# Patient Record
Sex: FEMALE | Race: WHITE
[De-identification: names, ages, dates, MRNs, and addresses within clinical notes are randomized per-mention and may not be internally consistent; named-entity substitution may affect disease eponyms.]

---

## 2017-07-02 NOTE — EMERGENCY DEPARTMENT RECORD
History of Present Illness





- General


Chief Complaint: Back Pain/Injury


Stated Complaint: BACK,NECK,HEAD PAIN- HX HAD BABY 17


Time Seen by Provider: 17 13:39


Source: Patient, RN notes reviewed





- History of Present Illness


Initial Comments: 


patient complaining of a headache and neck and back pain and delivered a baby 

about one week ago and she left [facility] 2 days ago and she has had the neck and 

lyric pain then but now she has a headache. No vomiting No fever and she is not 

toxic looking. No nuchal signs. Patient had preeclampsia  and  on labetalol 

400mg bid





Onset/Timin


-: Days(s)


Severity scale (1-10): 10


Consistency: Constant


Improves With: None


Context: Other


Associated Symptoms: Headaches


Treatments Prior to Arrival: Prescription analgesics





- Related Data


 Home Medications











 Medication  Instructions  Recorded  Confirmed  Last Taken


 


Acetaminop W/ Codeine 300/30Mg 1 tab PO Q4H 17





[Tylenol #3]    


 


Docusate Sodium [Colace] 100 mg PO BID 17


 


Ferrous Sulfate 325 mg PO BID 17


 


Ibuprofen [Motrin 600Mg] 600 mg PO Q6H 17


 


Labetalol HCl 100 mg PO BID 17


 


Labetalol HCl 200 mg PO BID 17


 


Sertraline HCl [Zoloft] 50 mg PO DAILY 17








 Previous Rx's











 Medication  Instructions  Recorded


 


Hydrocodone/Acetaminophen [Norco 1 each PO Q6HR #14 tablet 17





5-325 Tablet]  











 Allergies











Allergy/AdvReac Type Severity Reaction Status Date / Time


 


No Known Drug Allergies Allergy   Verified 17 13:32














Travel Screening





- Travel/Exposure Within Last 30 Days


Have you traveled within the last 30 days?: No





- Travel/Exposure Within Last Year


Have you traveled outside the [place] in the last year?: No





- Additonal Travel Details


Have you been exposed to anyone with a communicable illness?: No





- Travel Symptoms


Symptom Screening: None





Review of Systems


Reviewed: No additional complaints except as noted below


Constitutional: Reports: As per HPI.  Denies: Chills, Fever, Malaise, Night 

sweats, Weakness, Weight change


Eyes: Reports: As per HPI.  Denies: Eye discharge, Eye pain, Photophobia, 

Vision change


ENT: Reports: As per HPI.  Denies: Congestion, Dental pain, Ear pain, Epistaxis

, Hearing loss, Throat pain


Respiratory: Reports: As per HPI.  Denies: Cough, Dyspnea, Hemoptysis, Stridor, 

Wheezes


Cardiovascular: Reports: As per HPI.  Denies: Arrhythmia, Chest pain, Dyspnea 

on exertion, Edema, Murmurs, Orthopnea, Palpitations, Paroxysmal nocturnal 

dyspnea, Rheumatic Fever, Syncope


Endocrine: Reports: As per HPI.  Denies: Fatigue, Heat or cold intolerance, 

Polydipsia, Polyuria


Gastrointestinal: Reports: As per HPI.  Denies: Abdominal pain, Constipation, 

Diarrhea, Hematemesis, Hematochezia, Melena, Nausea, Vomiting


Genitourinary: Reports: As per HPI.  Denies: Abnormal menses, Discharge, 

Dyspareunia, Dysuria, Frequency, Hematuria, Incontinence, Retention, Urgency


Musculoskeletal: Reports: As per HPI, Back pain, Neck pain.  Denies: Arthralgia

, Gout, Joint swelling, Myalgia


Skin: Reports: As per HPI.  Denies: Bruising, Change in color, Change in hair/

nails, Lesions, Pruritus, Rash


Neurological: Reports: As per HPI, Headache.  Denies: Abnormal gait, Confusion, 

Numbness, Paresthesias, Seizure, Tingling, Tremors, Vertigo, Weakness


Psychiatric: Reports: As per HPI.  Denies: Anxiety, Auditory hallucinations, 

Depression, Homicidal thoughts, Suicidal thoughts, Visual hallucinations


Hematological/Lymphatic: Reports: As per HPI.  Denies: Anemia, Blood Clots, 

Easy bleeding, Easy bruising, Swollen glands





Past Medical History





- SOCIAL HISTORY


Smoking Status: Current every day smoker


Alcohol Use: None


Drug Use: None





- RESPIRATORY


Hx Respiratory Disorders: Yes


Hx Asthma: Yes


Hx Bronchitis: Yes





- CARDIOVASCULAR


Hx Cardio Disorders: Yes


Hx Hypertension: Yes





- NEURO


Hx Neuro Disorders: Yes


Hx Headaches: Yes


Hx Seizures: Yes





- GI


Hx GI Disorders: No





- 


Hx Genitourinary Disorders: No





- ENDOCRINE


Hx Endocrine Disorders: No





- MUSCULOSKELETAL


Hx Musculoskeletal Disorders: No





- PSYCH


Hx Psych Problems: Yes


Hx Depression: Yes





- HEMATOLOGY/ONCOLOGY


Hx Hematology/Oncology Disorders: Yes


Hx Anemia: Yes





Family Medical History


Any Significant Family History?: No





Physical Exam





- General


General Appearance: Alert, Oriented x3, Cooperative, No acute distress





- Head


Head exam: Normal inspection





- Eye


Eye exam: Normal appearance, PERRL


Pupils: Normal accommodation





- ENT


ENT exam: Normal exam, Mucous membranes moist, Normal external ear exam, Normal 

orophraynx, TM's normal bilaterally


Ear exam: Normal external inspection.  negative: External canal tenderness


Nasal Exam: Normal inspection.  negative: Discharge, Sinus tenderness


Mouth exam: Normal external inspection, Tongue normal


Teeth exam: Normal inspection.  negative: Dental caries


Throat exam: Normal inspection.  negative: Tonsillar erythema, Tonsillar exudate





- Neck


Neck exam: Normal inspection, Full ROM.  negative: Tenderness





- Respiratory


Respiratory exam: Normal lung sounds bilaterally.  negative: Respiratory 

distress





- Cardiovascular


Cardiovascular Exam: Regular rate, Normal rhythm, Normal heart sounds





- GI/Abdominal


GI/Abdominal exam: Soft, Normal bowel sounds.  negative: Tenderness





- Rectal


Rectal exam: Deferred





- 


 exam: Deferred





- Extremities


Extremities exam: Normal inspection, Full ROM, Normal capillary refill.  

negative: Tenderness





- Back


Back exam: Reports: Normal inspection, Full ROM.  Denies: Muscle spasm, Rash 

noted, Tenderness





- Neurological


Neurological exam: Alert, Normal gait, Oriented X3, Reflexes normal





- Psychiatric


Psychiatric exam: Normal affect, Normal mood





- Skin


Skin exam: Dry, Intact, Normal color, Warm





Course





 Vital Signs











  17





  13:23


 


Temperature 97.9 F


 


Pulse Rate [ 75





Pulse Ox Probe] 


 


Respiratory 14





Rate 


 


Blood Pressure 142/88





[Left Arm] 


 


Pulse Ox 98














- Reevaluation(s)


Reevaluation #1: 


patient is feeling better


17 15:47








Medical Decision Making





- Lab Data


Result diagrams: 


 17 13:50





 17 13:50





Disposition


Clinical Impression: 


 Dehydration, Chronic neck and back pain





Headache


Qualifiers:


 Headache type: tension-type Headache chronicity pattern: acute headache 

Intractability: not intractable Qualified Code(s): G44.209 - Tension-type 

headache, unspecified, not intractable





Disposition: Home, Self-Care


Condition: (1) Good


Instructions:  General Headache (ED)


Additional Instructions: 


follow up with gyn  tomorrow and may need a blood patch for the headaches


drink lots of fluid 


Prescriptions: 


Hydrocodone/Acetaminophen [Norco 5-325 Tablet] 1 each PO Q6HR #14 tablet


Forms:  Patient Portal Access


Time of Disposition: 15:49

## 2019-11-28 ENCOUNTER — HOSPITAL ENCOUNTER (EMERGENCY)
Dept: HOSPITAL 59 - ER | Age: 44
Discharge: HOME | End: 2019-11-28
Payer: MEDICAID

## 2019-11-28 DIAGNOSIS — F17.210: ICD-10-CM

## 2019-11-28 DIAGNOSIS — I10: ICD-10-CM

## 2019-11-28 DIAGNOSIS — J02.0: Primary | ICD-10-CM

## 2019-11-28 PROCEDURE — 99283 EMERGENCY DEPT VISIT LOW MDM: CPT

## 2019-11-28 PROCEDURE — 87880 STREP A ASSAY W/OPTIC: CPT

## 2019-11-28 NOTE — EMERGENCY DEPARTMENT RECORD
History of Present Illness





- General


Chief complaint: Mouth sores/ulcers


Stated complaint: SORES ON TONGUE


Time Seen by Provider: 19 17:06


Source: Patient


Mode of Arrival: Ambulatory


Limitations: No limitations





- History of Present Illness


Initial comments: 


The patient is here due to a one day hx of a mild ST and sores on her tongue. 

She denies any new cough, fever, HA, ear pain or SOB.





MD complaint: Sore throat


Onset/Timin


-: Days(s)


Location: Throat


Severity: Mild


Severity scale (1-10): 5


Quality: Aching


Consistency: Constant


Improves with: None


Worsens with: None





- Related Data


                                  Previous Rx's











 Medication  Instructions  Recorded


 


Cephalexin [Keflex] 500 mg PO TID #21 cap 19











                                    Allergies











Allergy/AdvReac Type Severity Reaction Status Date / Time


 


No Known Drug Allergies Allergy   Verified 19 17:08














Travel Screening





- Travel/Exposure Within Last 30 Days


Have you traveled within the last 30 days?: No





- Travel/Exposure Within Last Year


Have you traveled outside the [place] in the last year?: No





- Additonal Travel Details


Have you been exposed to anyone with a communicable illness?: No





- Travel Symptoms


Symptom Screening: None





Review of Systems


Constitutional: Denies: Chills, Fever


Eyes: Denies: Eye discharge


ENT: Denies: Congestion


Respiratory: Denies: Cough





Past Medical History





- SOCIAL HISTORY


Smoking Status: Current every day smoker


Alcohol Use: Occasional


Drug Use: Occasional


Drug Use Detail:: Marijuana





- RESPIRATORY


Hx Respiratory Disorders: Yes


Hx Asthma: Yes


Hx Bronchitis: Yes





- CARDIOVASCULAR


Hx Cardio Disorders: Yes


Hx Hypertension: Yes





- NEURO


Hx Neuro Disorders: Yes


Hx Headaches: Yes





- GI


Hx GI Disorders: No





- 


Hx Genitourinary Disorders: No





- ENDOCRINE


Hx Endocrine Disorders: No





- MUSCULOSKELETAL


Hx Musculoskeletal Disorders: No





- PSYCH


Hx Psych Problems: Yes


Hx Depression: Yes





- HEMATOLOGY/ONCOLOGY


Hx Hematology/Oncology Disorders: Yes


Hx Anemia: Yes





Family Medical History


Any Significant Family History?: Yes





Physical Exam





- General


General Appearance: Alert, Oriented x3, Cooperative, No acute distress





- Head


Head exam: Atraumatic, Normocephalic, Normal inspection





- Eye


Eye exam: Normal appearance, PERRL, EOMI





- ENT


ENT exam: TM's normal bilaterally


Mouth exam: negative: Tongue elevation, Tongue normal (There are a few vesicles 

on the tongue.)


Throat exam: Tonsillar erythema, Other (There are a few vesicles on the soft 

palate with significant erythema but no definite exudate.).  negative: Normal 

inspection, Tonsillomegaly, Tonsillar exudate





- Neck


Neck exam: Normal inspection, Full ROM.  negative: Lymphadenopathy, Tenderness





- Respiratory


Respiratory exam: Normal lung sounds bilaterally.  negative: Respiratory 

distress





- Cardiovascular


Cardiovascular Exam: Regular rate, Normal rhythm, Normal heart sounds





- Extremities


Extremities exam: negative: Tenderness





- Neurological


Neurological exam: Alert.  negative: Motor sensory deficit





Course





                                   Vital Signs











  19





  17:02


 


Temperature 98.6 F


 


Pulse Rate 92 H


 


Respiratory 18





Rate 


 


Blood Pressure 135/85


 


Pulse Ox 98














- Reevaluation(s)


Reevaluation #1: 


I did discuss the positive strep with the patient and the need for F/U next week

if not better. We will place the patient on Keflex.


19 17:50








Medical Decision Making





- Data Complexity


MDM Data: Labs Ordered and/or Reviewed (Strep: Pos.)





Disposition


Disposition: Discharge


Clinical Impression: 


 Strep sore throat





Disposition: Home, Self-Care


Condition: (2) Stable


Instructions:  Strep Throat (ED)


Additional Instructions: 


Please use Tylenol or Motrin for pain and continue the Keflex. Please see your 

family doctor for recheck next week if not better. Return to the ER for any 

worsening symptoms.


Prescriptions: 


Cephalexin [Keflex] 500 mg PO TID #21 cap


Forms:  Patient Portal Access


Time of Disposition: 17:53





Quality





- Quality Measures


Quality Measures: N/A





- Blood Pressure Screening


View Details: Yes


Does Patient Have Any of the Following: No


Blood Pressure Classification: Pre-Hypertensive BP Reading


Systolic Measurement: 135


Diastolic Measurement: 85


Screening for High Blood Pressure: < Pre-Hypertensive BP, F/U Documented > 

[]


Pre-Hypertensive Follow-up Interventions: Referral to alternative/primary care 

provider.